# Patient Record
Sex: FEMALE | Race: WHITE | NOT HISPANIC OR LATINO | Employment: FULL TIME | URBAN - METROPOLITAN AREA
[De-identification: names, ages, dates, MRNs, and addresses within clinical notes are randomized per-mention and may not be internally consistent; named-entity substitution may affect disease eponyms.]

---

## 2023-03-24 LAB
CHLAMYDIA TRACH., AMPLIFIED: NEGATIVE
N. GONORRHEA, AMPLIFIED: NEGATIVE

## 2023-04-06 ENCOUNTER — OFFICE VISIT (OUTPATIENT)
Dept: PRIMARY CARE | Facility: CLINIC | Age: 33
End: 2023-04-06
Payer: COMMERCIAL

## 2023-04-06 VITALS
BODY MASS INDEX: 37.13 KG/M2 | SYSTOLIC BLOOD PRESSURE: 112 MMHG | DIASTOLIC BLOOD PRESSURE: 70 MMHG | HEART RATE: 84 BPM | HEIGHT: 62 IN | WEIGHT: 201.8 LBS

## 2023-04-06 DIAGNOSIS — D89.89 AUTOIMMUNE DISORDER (MULTI): ICD-10-CM

## 2023-04-06 DIAGNOSIS — F41.9 ANXIETY: Primary | ICD-10-CM

## 2023-04-06 PROBLEM — E78.5 DYSLIPIDEMIA: Status: ACTIVE | Noted: 2023-04-06

## 2023-04-06 PROBLEM — G47.00 INSOMNIA: Status: ACTIVE | Noted: 2023-04-06

## 2023-04-06 PROBLEM — R31.9 HEMATURIA: Status: ACTIVE | Noted: 2023-04-06

## 2023-04-06 PROBLEM — Z98.84 HISTORY OF BARIATRIC SURGERY: Status: ACTIVE | Noted: 2023-04-06

## 2023-04-06 PROBLEM — N94.6 DYSMENORRHEA: Status: ACTIVE | Noted: 2023-04-06

## 2023-04-06 PROBLEM — L70.9 ACNE: Status: ACTIVE | Noted: 2023-04-06

## 2023-04-06 PROBLEM — E61.1 IRON DEFICIENCY: Status: ACTIVE | Noted: 2023-04-06

## 2023-04-06 PROCEDURE — 99213 OFFICE O/P EST LOW 20 MIN: CPT | Performed by: STUDENT IN AN ORGANIZED HEALTH CARE EDUCATION/TRAINING PROGRAM

## 2023-04-06 PROCEDURE — 1036F TOBACCO NON-USER: CPT | Performed by: STUDENT IN AN ORGANIZED HEALTH CARE EDUCATION/TRAINING PROGRAM

## 2023-04-06 RX ORDER — SERTRALINE HYDROCHLORIDE 100 MG/1
100 TABLET, FILM COATED ORAL DAILY
COMMUNITY
Start: 2020-10-05 | End: 2023-10-20 | Stop reason: WASHOUT

## 2023-04-06 NOTE — PROGRESS NOTES
"Discuss appointment with Dr Torres, functional medicine in [place].    Subjective   Patient ID: Clover Chandler is a 32 y.o. female who presents for Follow-up.    HPI  Regarding functional medicine eval, recently established with Dr. Torres and was diagnosed with autoimmune brain disease per pt report. Was started on NeuroFlam supplement about 2-3wks ago and has noticed that she is starting to feel a little better. Has also cut out dairy, gluten, and processed sugar. Will be doing her own [place] this year. States that some mild epilepsy symptoms have surfaced due to resolution of inflammation in frontal lobe per her functional medicine doctor's report - having some zap/shock feelings and eye fluttering. Has a follow up appt scheduled in 1mo from now. Was told that a few of her autoimmune markers were consistent with autism. Also notes that her identical twin was recently diagnosed with autism.    Regarding anxiety, continues to do well on current dose of Zoloft. Struggling some with chronic fatigue which is attributed to above. Continues to follow with her usual therapist.    Regarding dysmenorrhea, scheduled for Liletta placement today.    Review of Systems   Constitutional:  Negative for chills and fever.   Respiratory:  Negative for cough and shortness of breath.    Cardiovascular:  Negative for chest pain and palpitations.   Skin:  Negative for rash.   Psychiatric/Behavioral:  Negative for dysphoric mood. The patient is not nervous/anxious.      Objective   /70   Pulse 84   Ht 1.575 m (5' 2\")   Wt 91.5 kg (201 lb 12.8 oz)   BMI 36.91 kg/m²     Physical Exam  Constitutional:       Appearance: Normal appearance.   HENT:      Head: Normocephalic.   Eyes:      General: No scleral icterus.     Conjunctiva/sclera: Conjunctivae normal.   Pulmonary:      Effort: Pulmonary effort is normal. No respiratory distress.   Skin:     Findings: No rash.   Neurological:      Mental Status: She is alert. "   Psychiatric:         Mood and Affect: Mood normal.         Behavior: Behavior normal.       Assessment/Plan   Problem List Items Addressed This Visit       Anxiety - Primary     Doing well with Zoloft. Will continue. Continue close follow up with therapist as previously scheduled.         Relevant Orders    Follow Up In Primary Care    Autoimmune disorder (CMS/HCC)     Diagnosed with autoimmune brain disease per pt report and starting to notice subjective improvement on NeuroFlam supplement. Plans to drop off records/labs for our review.        Follow up in 6mo for recheck, sooner if needed.

## 2023-04-07 PROBLEM — D89.89 AUTOIMMUNE DISORDER (MULTI): Status: ACTIVE | Noted: 2023-04-07

## 2023-04-07 ASSESSMENT — ENCOUNTER SYMPTOMS
DYSPHORIC MOOD: 0
SHORTNESS OF BREATH: 0
NERVOUS/ANXIOUS: 0
FEVER: 0
PALPITATIONS: 0
COUGH: 0
CHILLS: 0

## 2023-04-08 NOTE — ASSESSMENT & PLAN NOTE
Doing well with Zoloft. Will continue. Continue close follow up with therapist as previously scheduled.

## 2023-04-08 NOTE — ASSESSMENT & PLAN NOTE
Diagnosed with autoimmune brain disease per pt report and starting to notice subjective improvement on NeuroFlam supplement. Plans to drop off records/labs for our review.

## 2023-10-20 ENCOUNTER — TELEMEDICINE (OUTPATIENT)
Dept: PRIMARY CARE | Facility: CLINIC | Age: 33
End: 2023-10-20
Payer: COMMERCIAL

## 2023-10-20 DIAGNOSIS — J01.10 ACUTE NON-RECURRENT FRONTAL SINUSITIS: Primary | ICD-10-CM

## 2023-10-20 PROCEDURE — 99213 OFFICE O/P EST LOW 20 MIN: CPT | Performed by: NURSE PRACTITIONER

## 2023-10-20 RX ORDER — DOXYCYCLINE 100 MG/1
100 CAPSULE ORAL 2 TIMES DAILY
Qty: 14 CAPSULE | Refills: 0 | Status: SHIPPED | OUTPATIENT
Start: 2023-10-20 | End: 2023-10-27

## 2023-10-20 ASSESSMENT — ENCOUNTER SYMPTOMS
SORE THROAT: 1
TROUBLE SWALLOWING: 1
VOMITING: 0
MUSCULOSKELETAL NEGATIVE: 1
HOARSE VOICE: 1
FEVER: 0
DIARRHEA: 0
FATIGUE: 0
CARDIOVASCULAR NEGATIVE: 1
COUGH: 1
NEUROLOGICAL NEGATIVE: 1
ABDOMINAL PAIN: 0
SWOLLEN GLANDS: 1

## 2023-10-20 NOTE — PROGRESS NOTES
Subjective   Patient ID: Clover Chandler is a 33 y.o. female who presents for illness.    Sore Throat   This is a new problem. The current episode started yesterday. The problem has been gradually worsening. The pain is worse on the left side. There has been no fever. The pain is at a severity of 4/10. Associated symptoms include congestion, coughing, ear discharge, ear pain, a hoarse voice, a plugged ear sensation, swollen glands and trouble swallowing. Pertinent negatives include no abdominal pain, diarrhea, drooling or vomiting. She has had exposure to strep. She has had no exposure to mono. The treatment provided no relief.      Clover returns via virtual visit. ([facility] pt)    Symptoms started yesterday sore throat, raw feeling the day before. She has been doing cross fit, and has noticed that her chest has been feeling heavy when exercising yesterday. No fever, no shortness of breath. Cough non productive. Green nasal discharge. Has tried some OTC no relief of symptoms. Off work today.     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 Exposures.       Review of Systems   Constitutional:  Negative for fatigue and fever.   HENT:  Positive for congestion, ear discharge, ear pain, hoarse voice, sore throat and trouble swallowing. Negative for drooling.    Respiratory:  Positive for cough.    Cardiovascular: Negative.    Gastrointestinal:  Negative for abdominal pain, diarrhea and vomiting.   Genitourinary: Negative.    Musculoskeletal: Negative.    Neurological: Negative.        Objective   There were no vitals taken for this visit.    Physical Exam  Constitutional:       General: She is not in acute distress.     Appearance: Normal appearance. She is ill-appearing. She is not toxic-appearing or diaphoretic.   HENT:      Nose: Congestion present.   Pulmonary:      Effort: Pulmonary effort is normal.   Neurological:      General: No focal deficit present.      Mental Status: She is alert and oriented to  person, place, and time.   Psychiatric:         Mood and Affect: Mood normal.         Behavior: Behavior normal.         Thought Content: Thought content normal.         Judgment: Judgment normal.         Assessment/Plan   Problem List Items Addressed This Visit    None  Visit Diagnoses         Codes    Acute non-recurrent frontal sinusitis    -  Primary J01.10    Relevant Medications    doxycycline (Vibramycin) 100 mg capsule              Follow up as scheduled.

## 2023-10-20 NOTE — LETTER
Clover Chandler was seen on 10/20/2023.  She may return to work on 10/21/2023.       If you have any questions or concerns, please don't hesitate to call.      Danielle Thapa, CNP

## 2023-10-23 ENCOUNTER — OFFICE VISIT (OUTPATIENT)
Dept: PRIMARY CARE | Facility: CLINIC | Age: 33
End: 2023-10-23
Payer: COMMERCIAL

## 2023-10-23 VITALS
HEART RATE: 76 BPM | HEIGHT: 62 IN | BODY MASS INDEX: 38.68 KG/M2 | DIASTOLIC BLOOD PRESSURE: 74 MMHG | SYSTOLIC BLOOD PRESSURE: 122 MMHG | WEIGHT: 210.2 LBS

## 2023-10-23 DIAGNOSIS — E66.01 CLASS 2 SEVERE OBESITY DUE TO EXCESS CALORIES WITH SERIOUS COMORBIDITY AND BODY MASS INDEX (BMI) OF 38.0 TO 38.9 IN ADULT (MULTI): Primary | ICD-10-CM

## 2023-10-23 DIAGNOSIS — D89.89 AUTOIMMUNE DISORDER (MULTI): ICD-10-CM

## 2023-10-23 DIAGNOSIS — E88.819 INSULIN RESISTANCE: ICD-10-CM

## 2023-10-23 DIAGNOSIS — F41.9 ANXIETY: ICD-10-CM

## 2023-10-23 DIAGNOSIS — R73.01 ELEVATED FASTING GLUCOSE: ICD-10-CM

## 2023-10-23 DIAGNOSIS — F32.0 CURRENT MILD EPISODE OF MAJOR DEPRESSIVE DISORDER WITHOUT PRIOR EPISODE (CMS-HCC): ICD-10-CM

## 2023-10-23 PROBLEM — E66.812 CLASS 2 SEVERE OBESITY DUE TO EXCESS CALORIES WITH SERIOUS COMORBIDITY AND BODY MASS INDEX (BMI) OF 38.0 TO 38.9 IN ADULT: Status: ACTIVE | Noted: 2023-10-23

## 2023-10-23 PROCEDURE — 1036F TOBACCO NON-USER: CPT | Performed by: STUDENT IN AN ORGANIZED HEALTH CARE EDUCATION/TRAINING PROGRAM

## 2023-10-23 PROCEDURE — 99213 OFFICE O/P EST LOW 20 MIN: CPT | Performed by: STUDENT IN AN ORGANIZED HEALTH CARE EDUCATION/TRAINING PROGRAM

## 2023-10-23 PROCEDURE — 3008F BODY MASS INDEX DOCD: CPT | Performed by: STUDENT IN AN ORGANIZED HEALTH CARE EDUCATION/TRAINING PROGRAM

## 2023-10-23 ASSESSMENT — ENCOUNTER SYMPTOMS
SHORTNESS OF BREATH: 0
FEVER: 0
DYSPHORIC MOOD: 0
NERVOUS/ANXIOUS: 0
COUGH: 0
PALPITATIONS: 0
CHILLS: 0

## 2023-10-23 NOTE — ASSESSMENT & PLAN NOTE
Dietary modifications and recommendation for 150 minutes of moderate-intensity exercise per week reviewed. Pt interested in pharmacologic assistance. Discussed options and decided to trial GLP1. Medication dosing and side effects reviewed. Follow up in 6wks for recheck, sooner if needed.

## 2023-10-23 NOTE — PROGRESS NOTES
"Subjective   Patient ID: Clover Chandler is a 33 y.o. female who presents for discuss weight loss.     HPI  BMI - doing crossfit 3x/wk 60min and walking 2x/wk 3-4mi (~1hr), not calculating macros due to hx disordered eating but really watching her diet, usually grazes small frequent meals due to hx gastric sleeve, cut out almost all grains due to autoimmune condition, no personal hx pancreatitis or fhx thyroid ca, diagnosed with insulin resistance by functional medicine provider, she has personal hx elevated BG and has strong fhx DM2    Anx/dep - stopped Zoloft when she started taking neuro inflammatory, depression flaring some but manageable, anxiety doing well, crossfit helping    Review of Systems   Constitutional:  Negative for chills and fever.   Respiratory:  Negative for cough and shortness of breath.    Cardiovascular:  Negative for chest pain and palpitations.   Skin:  Negative for rash.   Psychiatric/Behavioral:  Negative for dysphoric mood. The patient is not nervous/anxious.      Objective   /74   Pulse 76   Ht 1.575 m (5' 2\")   Wt 95.3 kg (210 lb 3.2 oz)   BMI 38.45 kg/m²     Physical Exam  Constitutional:       Appearance: Normal appearance.   HENT:      Head: Normocephalic.   Eyes:      General: No scleral icterus.     Conjunctiva/sclera: Conjunctivae normal.   Pulmonary:      Effort: Pulmonary effort is normal. No respiratory distress.   Musculoskeletal:         General: Normal range of motion.   Skin:     Findings: No rash.   Neurological:      Mental Status: She is alert.   Psychiatric:         Mood and Affect: Mood normal.         Behavior: Behavior normal.       Assessment/Plan   Problem List Items Addressed This Visit             ICD-10-CM    Insulin resistance E88.819     GLP1 trial.         Relevant Medications    semaglutide 0.25 mg or 0.5 mg (2 mg/3 mL) pen injector    Other Relevant Orders    Follow Up In Primary Care - Established    Elevated fasting glucose R73.01     GLP1 " trial.         Relevant Medications    semaglutide 0.25 mg or 0.5 mg (2 mg/3 mL) pen injector    Other Relevant Orders    Follow Up In Primary Care - Established    Current mild episode of major depressive disorder without prior episode (CMS/[facility]) F32.0     Overall managing well off the SSRI. Will continue to monitor.         Class 2 severe obesity due to excess calories with serious comorbidity and body mass index (BMI) of 38.0 to 38.9 in adult (CMS/[facility]) - Primary E66.01, Z68.38     Dietary modifications and recommendation for 150 minutes of moderate-intensity exercise per week reviewed. Pt interested in pharmacologic assistance. Discussed options and decided to trial GLP1. Medication dosing and side effects reviewed. Follow up in 6wks for recheck, sooner if needed.         Relevant Medications    semaglutide 0.25 mg or 0.5 mg (2 mg/3 mL) pen injector    Other Relevant Orders    Follow Up In Primary Care - Established    Autoimmune disorder (CMS/[facility]) D89.89     Pt will try to get us copy of workup done by functional medicine provider.         Anxiety F41.9     Managing well off the SSRI.        Follow up in 6wk for recheck, sooner if needed.

## 2023-10-30 DIAGNOSIS — F32.0 CURRENT MILD EPISODE OF MAJOR DEPRESSIVE DISORDER WITHOUT PRIOR EPISODE (CMS-HCC): Primary | ICD-10-CM

## 2023-11-02 RX ORDER — BUPROPION HYDROCHLORIDE 150 MG/1
150 TABLET ORAL EVERY MORNING
Qty: 30 TABLET | Refills: 1 | Status: SHIPPED | OUTPATIENT
Start: 2023-11-02 | End: 2024-01-24 | Stop reason: SDUPTHER

## 2023-12-11 ENCOUNTER — APPOINTMENT (OUTPATIENT)
Dept: PRIMARY CARE | Facility: CLINIC | Age: 33
End: 2023-12-11
Payer: COMMERCIAL

## 2023-12-27 DIAGNOSIS — F32.0 CURRENT MILD EPISODE OF MAJOR DEPRESSIVE DISORDER WITHOUT PRIOR EPISODE (CMS-HCC): ICD-10-CM

## 2024-01-24 ENCOUNTER — OFFICE VISIT (OUTPATIENT)
Dept: PRIMARY CARE | Facility: CLINIC | Age: 34
End: 2024-01-24
Payer: COMMERCIAL

## 2024-01-24 VITALS
DIASTOLIC BLOOD PRESSURE: 64 MMHG | HEIGHT: 62 IN | BODY MASS INDEX: 39.71 KG/M2 | SYSTOLIC BLOOD PRESSURE: 106 MMHG | WEIGHT: 215.8 LBS | HEART RATE: 80 BPM

## 2024-01-24 DIAGNOSIS — E66.01 CLASS 2 SEVERE OBESITY DUE TO EXCESS CALORIES WITH SERIOUS COMORBIDITY AND BODY MASS INDEX (BMI) OF 39.0 TO 39.9 IN ADULT (MULTI): Primary | ICD-10-CM

## 2024-01-24 DIAGNOSIS — D89.89 AUTOIMMUNE DISORDER (MULTI): ICD-10-CM

## 2024-01-24 DIAGNOSIS — F32.0 CURRENT MILD EPISODE OF MAJOR DEPRESSIVE DISORDER WITHOUT PRIOR EPISODE (CMS-HCC): ICD-10-CM

## 2024-01-24 DIAGNOSIS — R00.2 PALPITATIONS: ICD-10-CM

## 2024-01-24 PROCEDURE — 99214 OFFICE O/P EST MOD 30 MIN: CPT | Performed by: STUDENT IN AN ORGANIZED HEALTH CARE EDUCATION/TRAINING PROGRAM

## 2024-01-24 PROCEDURE — 1036F TOBACCO NON-USER: CPT | Performed by: STUDENT IN AN ORGANIZED HEALTH CARE EDUCATION/TRAINING PROGRAM

## 2024-01-24 PROCEDURE — 3008F BODY MASS INDEX DOCD: CPT | Performed by: STUDENT IN AN ORGANIZED HEALTH CARE EDUCATION/TRAINING PROGRAM

## 2024-01-24 RX ORDER — BUPROPION HYDROCHLORIDE 150 MG/1
TABLET ORAL
Qty: 30 TABLET | Refills: 0 | OUTPATIENT
Start: 2024-01-24

## 2024-01-24 RX ORDER — BUPROPION HYDROCHLORIDE 150 MG/1
150 TABLET ORAL EVERY MORNING
Qty: 90 TABLET | Refills: 1 | Status: SHIPPED | OUTPATIENT
Start: 2024-01-24 | End: 2025-01-23

## 2024-01-24 ASSESSMENT — ENCOUNTER SYMPTOMS
CHILLS: 0
NERVOUS/ANXIOUS: 0
FEVER: 0
PALPITATIONS: 1
SHORTNESS OF BREATH: 0
DYSPHORIC MOOD: 0
COUGH: 0

## 2024-01-24 NOTE — PROGRESS NOTES
"Subjective   Patient ID: Clover Chandler is a 33 y.o. female who presents for follow up on medication    HPI   BMI - ran out of Wellbutrin a couple weeks ago, does think it helped some with motivation and is interested in restarting, she has consistently been doing Crossfit and following healthy diet since 10/2023, choosing antiinflammatory meals through meal delivery service    Autoimmune disorder - considering establishing with new specialist, considering neurofeedback in [place]    Palpitations - has noticed occasional episodes at random, HR increases from 60 to 150 at rest with associated palpitations that somewhat take her breath away, feels like she is on verge of a panic attack, otherwise denies CP, HA, LH, dizziness, episodes last on average 10-15min, occur on average 3x/month, has fhx a fib, symptoms seem to resolve with valsalva    Review of Systems   Constitutional:  Negative for chills and fever.   Respiratory:  Negative for cough and shortness of breath.    Cardiovascular:  Positive for palpitations. Negative for chest pain.   Skin:  Negative for rash.   Psychiatric/Behavioral:  Negative for dysphoric mood. The patient is not nervous/anxious.      Objective   /64   Pulse 80   Ht 1.575 m (5' 2\")   Wt 97.9 kg (215 lb 12.8 oz)   BMI 39.47 kg/m²     Physical Exam  Constitutional:       Appearance: Normal appearance.   HENT:      Head: Normocephalic and atraumatic.   Eyes:      General: No scleral icterus.     Conjunctiva/sclera: Conjunctivae normal.   Cardiovascular:      Rate and Rhythm: Normal rate and regular rhythm.      Heart sounds: No murmur heard.  Pulmonary:      Effort: Pulmonary effort is normal. No respiratory distress.      Breath sounds: Normal breath sounds.   Musculoskeletal:         General: Normal range of motion.      Cervical back: Normal range of motion and neck supple.   Skin:     General: Skin is warm and dry.      Findings: No rash.   Neurological:      General: No focal " deficit present.      Mental Status: She is alert.   Psychiatric:         Mood and Affect: Mood normal.         Behavior: Behavior normal.       Assessment/Plan   Problem List Items Addressed This Visit             ICD-10-CM    Palpitations R00.2     Occasional with associated tachycardia, SOB with fhx a fib. Reviewed possible etiologies and usual workup. Using shared decision making and given infrequent, we decided to continue to monitor for now. If more frequent or increasing in severity (or if persistent at next eval), will plan for further workup with likely echo/Holter. Return precautions reviewed.          Current mild episode of major depressive disorder without prior episode (CMS/[facility]) F32.0     Will restart Wellbutrin.         Relevant Medications    buPROPion XL (Wellbutrin XL) 150 mg 24 hr tablet    Other Relevant Orders    Follow Up In Primary Care - Established    Class 2 severe obesity due to excess calories with serious comorbidity and body mass index (BMI) of 39.0 to 39.9 in adult (CMS/[facility]) - Primary E66.01, Z68.39     Continues to work hard on lifestyle modification. Using shared decision making, we decided to restart Wellbutrin. Medication dosing and side effects reviewed. She will let us know if she is interested in increasing to 300mg every day prior to next appt. Return precautions reviewed.          Autoimmune disorder (CMS/[facility]) D89.89     Stable.        Follow up in 6mo for recheck, sooner if needed.   Time Spent  Prep time on day of patient encounter: 1 minutes  Time spent directly with patient, family or caregiver: 28 minutes  Additional Time Spent on Patient Care Activities: 0 minutes  Documentation Time: 6 minutes  Other Time Spent: 0 minutes  Total: 35 minutes

## 2024-01-25 NOTE — ASSESSMENT & PLAN NOTE
Continues to work hard on lifestyle modification. Using shared decision making, we decided to restart Wellbutrin. Medication dosing and side effects reviewed. She will let us know if she is interested in increasing to 300mg every day prior to next appt. Return precautions reviewed.

## 2024-01-25 NOTE — ASSESSMENT & PLAN NOTE
Occasional with associated tachycardia, SOB with fhx a fib. Reviewed possible etiologies and usual workup. Using shared decision making and given infrequent, we decided to continue to monitor for now. If more frequent or increasing in severity (or if persistent at next eval), will plan for further workup with likely echo/Holter. Return precautions reviewed.

## 2024-06-12 ENCOUNTER — OFFICE VISIT (OUTPATIENT)
Dept: OBSTETRICS AND GYNECOLOGY | Facility: CLINIC | Age: 34
End: 2024-06-12
Payer: COMMERCIAL

## 2024-06-12 ENCOUNTER — LAB (OUTPATIENT)
Dept: LAB | Facility: LAB | Age: 34
End: 2024-06-12
Payer: COMMERCIAL

## 2024-06-12 VITALS — BODY MASS INDEX: 39.64 KG/M2 | WEIGHT: 215.4 LBS | HEIGHT: 62 IN

## 2024-06-12 DIAGNOSIS — Z11.3 SCREENING EXAMINATION FOR STD (SEXUALLY TRANSMITTED DISEASE): ICD-10-CM

## 2024-06-12 DIAGNOSIS — B37.31 YEAST VAGINITIS: ICD-10-CM

## 2024-06-12 DIAGNOSIS — Z11.3 SCREENING EXAMINATION FOR STD (SEXUALLY TRANSMITTED DISEASE): Primary | ICD-10-CM

## 2024-06-12 LAB
POC CLUE CELL WET PREP: ABNORMAL
POC TRICHOMONAS WET PREP: ABNORMAL
POC WBC WET PREP: ABNORMAL
POC YEAST CELLS WET PREP: PRESENT

## 2024-06-12 PROCEDURE — 36415 COLL VENOUS BLD VENIPUNCTURE: CPT

## 2024-06-12 PROCEDURE — 87491 CHLMYD TRACH DNA AMP PROBE: CPT

## 2024-06-12 PROCEDURE — 87210 SMEAR WET MOUNT SALINE/INK: CPT | Performed by: OBSTETRICS & GYNECOLOGY

## 2024-06-12 PROCEDURE — 3008F BODY MASS INDEX DOCD: CPT | Performed by: OBSTETRICS & GYNECOLOGY

## 2024-06-12 PROCEDURE — 86780 TREPONEMA PALLIDUM: CPT

## 2024-06-12 PROCEDURE — 87389 HIV-1 AG W/HIV-1&-2 AB AG IA: CPT

## 2024-06-12 PROCEDURE — 99213 OFFICE O/P EST LOW 20 MIN: CPT | Performed by: OBSTETRICS & GYNECOLOGY

## 2024-06-12 PROCEDURE — 1036F TOBACCO NON-USER: CPT | Performed by: OBSTETRICS & GYNECOLOGY

## 2024-06-12 PROCEDURE — 87591 N.GONORRHOEAE DNA AMP PROB: CPT

## 2024-06-12 RX ORDER — FLUCONAZOLE 150 MG/1
150 TABLET ORAL ONCE
Qty: 1 TABLET | Refills: 0 | Status: SHIPPED | OUTPATIENT
Start: 2024-06-12 | End: 2024-06-12

## 2024-06-12 NOTE — PROGRESS NOTES
Clover Chandler is a 33 y.o. year old female patient.  PCP = Dee Anderson DO    Chief Complaint   Patient presents with    Vaginitis/Bacterial Vaginosis     Patient here for poss yeast infection. Patient has had itching and irritation for the past 3 days. Denies any abnormal discharge or burning. Patient would also like a full STD testing panel done. LMP: IUD        HPI   Presents being that she has noticed some vaginal itching with slight discharge over the last 3 days.  She is concerned for possible yeast infection.  She also wishes to be screened for sexually transmitted diseases as she had a new partner recently.  She currently has IUD in place.  Denies any pelvic pain.    OB History          0    Para   0    Term   0       0    AB   0    Living   0         SAB   0    IAB   0    Ectopic   0    Multiple   0    Live Births   0                 Past Medical History:   Diagnosis Date    Acne     Acute insomnia     Anxiety        Past Surgical History:   Procedure Laterality Date    BARIATRIC SURGERY  2021    Gastric Sleeve; [facility]    COLONOSCOPY  2013    EYE SURGERY  1993    18 Months old       Review of Systems:   Constitutional: No fever or chills  Respiratory: No shortness of breath, or cough  Cardiovascular: No chest pain or syncope  Breasts: No breast pain, no masses, no nipple discharge  Gastrointestinal: No nausea, vomiting, or diarrhea, no abdominal pain  Genitourinary: No dysuria or frequency  Gynecology: Negative except as noted in history of present illness  All other: All other systems reviewed and negative for complaint    Medication Documentation Review Audit       Reviewed by Xavi Chadwick MD (Physician) on 24 at 1442      Medication Order Taking? Sig Documenting Provider Last Dose Status   buPROPion XL (Wellbutrin XL) 150 mg 24 hr tablet [id number]  Take 1 tablet (150 mg) by mouth once daily in the morning. Do not crush, chew, or split. Dee Anderson DO  Active       "               Ht 1.575 m (5' 2\")   Wt 97.7 kg (215 lb 6.4 oz)   BMI 39.40 kg/m²     PHYSICAL EXAMINATION:  Well-developed, well nourished, in no acute distress, alert and oriented x three, is pleasant and cooperative.  HEENT: Clear. Pupils equal, round and reactive to light and accommodation. Extraocular muscles are intact. Oral mucosa pink without exudate.   NECK: No lymphadenopathy, no thyromegaly.  LUNGS: Clear bilaterally.  HEART: Regular rate and rhythm without murmurs.  ABDOMEN: Normoactive bowel sounds, soft and nontender, no guarding or rebound tenderness, no CVA tenderness.  EXTREMITIES: No clubbing, cyanosis or edema.  NEUROLOGIC:  Cranial nerves II-XII grossly intact.  :  Normal external female genitalia, normal vulva, normal vagina. Normal urethral meatus, urethra and bladder. Normal appearing cervix.  IUD string seen at the cervix.  Normal-sized uterus, no adnexal masses or tenderness.  Wet prep is positive for yeast, negative for trichomonas or bacterial vaginosis.        Problem List Items Addressed This Visit    None  Visit Diagnoses       Screening examination for STD (sexually transmitted disease)    -  Primary    Relevant Orders    C. Trachomatis / N. Gonorrhoeae, Amplified Detection    HIV 1/2 Antigen/Antibody Screen with Reflex to Confirmation    Syphilis Screen with Reflex    Yeast vaginitis        Relevant Medications    fluconazole (Diflucan) 150 mg tablet    Other Relevant Orders    POCT Wet Mount manually resulted (Completed)             Provider Impression:  1.  Yeast vaginitis  2.  Screen for STD  We will obtain GC, chlamydia, HIV and syphilis testing.  Patient will be notified of the results.  Prescription for Diflucan for the yeast infection.  Patient to return in 2 months for annual exam.    "

## 2024-06-13 LAB
C TRACH RRNA SPEC QL NAA+PROBE: NEGATIVE
HIV 1+2 AB+HIV1 P24 AG SERPL QL IA: NONREACTIVE
N GONORRHOEA DNA SPEC QL PROBE+SIG AMP: NEGATIVE
TREPONEMA PALLIDUM IGG+IGM AB [PRESENCE] IN SERUM OR PLASMA BY IMMUNOASSAY: NONREACTIVE

## 2024-06-17 ENCOUNTER — TELEPHONE (OUTPATIENT)
Dept: OBSTETRICS AND GYNECOLOGY | Facility: CLINIC | Age: 34
End: 2024-06-17

## 2024-06-17 NOTE — TELEPHONE ENCOUNTER
----- Message from Xavi Chadwick MD sent at 6/14/2024  9:37 AM EDT -----  Please inform patient that the GC and Chlamydia cultures are negative.

## 2024-06-18 ENCOUNTER — APPOINTMENT (OUTPATIENT)
Dept: PRIMARY CARE | Facility: CLINIC | Age: 34
End: 2024-06-18
Payer: COMMERCIAL

## 2024-07-24 ENCOUNTER — APPOINTMENT (OUTPATIENT)
Dept: PRIMARY CARE | Facility: CLINIC | Age: 34
End: 2024-07-24
Payer: COMMERCIAL

## 2024-08-13 ENCOUNTER — APPOINTMENT (OUTPATIENT)
Dept: OBSTETRICS AND GYNECOLOGY | Facility: CLINIC | Age: 34
End: 2024-08-13
Payer: COMMERCIAL

## 2024-09-17 ENCOUNTER — OFFICE VISIT (OUTPATIENT)
Dept: PRIMARY CARE | Facility: CLINIC | Age: 34
End: 2024-09-17
Payer: COMMERCIAL

## 2024-09-17 VITALS
HEIGHT: 62 IN | WEIGHT: 221.6 LBS | BODY MASS INDEX: 40.78 KG/M2 | DIASTOLIC BLOOD PRESSURE: 74 MMHG | HEART RATE: 56 BPM | SYSTOLIC BLOOD PRESSURE: 108 MMHG

## 2024-09-17 DIAGNOSIS — M25.50 MULTIPLE JOINT PAIN: Primary | ICD-10-CM

## 2024-09-17 PROCEDURE — 1036F TOBACCO NON-USER: CPT | Performed by: STUDENT IN AN ORGANIZED HEALTH CARE EDUCATION/TRAINING PROGRAM

## 2024-09-17 PROCEDURE — 3008F BODY MASS INDEX DOCD: CPT | Performed by: STUDENT IN AN ORGANIZED HEALTH CARE EDUCATION/TRAINING PROGRAM

## 2024-09-17 PROCEDURE — 99212 OFFICE O/P EST SF 10 MIN: CPT | Performed by: STUDENT IN AN ORGANIZED HEALTH CARE EDUCATION/TRAINING PROGRAM

## 2024-09-17 RX ORDER — PREDNISONE 20 MG/1
40 TABLET ORAL DAILY
Qty: 10 TABLET | Refills: 0 | Status: SHIPPED | OUTPATIENT
Start: 2024-09-17

## 2024-09-17 NOTE — PROGRESS NOTES
"Subjective   Patient ID: Clover Chandler is a 34 y.o. female who presents for muscle stiffness, joint stiffness, anxiety. 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    HPI  Presenting today with overall flare of constellation of symptoms. Symptoms have been intermittent since spring of this year. Symptoms seem to flare every 3 weeks. Struggling with intermittent diffuse joint pain/stiffness, enlarged lymph nodes, gum pain. Went to dentist due to the gum pain and was told that she has TMJ and pinched nerve in her face. Was referred to chiropractor, which helped temporarily. Costly so not able to go regularly. States that she is having to manually drain her lymph system for the past 2wks. Seems to be helping. States that her neck and back are swollen every morning. Takes 2hr to get moving. States that she has been dealing with significant life stressors over the past few months. Continues to follow regularly with her therapist. Has been journaling. Nervous system feel overloaded. Of note, her twin sister has similar constellation of symptoms.    Review of Systems   Constitutional:  Negative for chills and fever.   HENT:          Gum pain   Respiratory:  Negative for cough and shortness of breath.    Cardiovascular:  Negative for chest pain.   Musculoskeletal:  Positive for arthralgias, back pain and myalgias.   Psychiatric/Behavioral:  Negative for dysphoric mood. The patient is nervous/anxious.      Objective   /74   Pulse 56   Ht 1.575 m (5' 2\")   Wt 101 kg (221 lb 9.6 oz)   BMI 40.53 kg/m²     Physical Exam  Vitals reviewed.   Constitutional:       General: She is not in acute distress.     Appearance: Normal appearance.   HENT:      Head: Normocephalic and atraumatic.   Eyes:      General: No scleral icterus.     Conjunctiva/sclera: Conjunctivae normal.   Cardiovascular:      Rate and Rhythm: Normal rate and regular rhythm.   Pulmonary:      Effort: Pulmonary effort is normal. No respiratory distress.     "  Breath sounds: Normal breath sounds.   Abdominal:      General: Bowel sounds are normal.      Palpations: Abdomen is soft.      Tenderness: There is abdominal tenderness (mild generalized). There is no guarding.   Musculoskeletal:         General: Tenderness (diffuse ttp paraspinal musculature cervical/thoracic/lumbar spine) present.      Cervical back: Normal range of motion and neck supple.   Skin:     General: Skin is warm.      Findings: No rash.   Neurological:      Mental Status: She is alert. Mental status is at baseline.   Psychiatric:         Mood and Affect: Mood normal.         Behavior: Behavior normal.       Assessment/Plan   Problem List Items Addressed This Visit             ICD-10-CM    Multiple joint pain - Primary M25.50     Presenting today with flare of her underlying autoimmune condition for which she previously followed with functional medicine. I suspect psychosomatic component due to recent life stressors and exacerbation of anxiety. We will trial short oral steroid taper due to significant joint pain/stiffness. Medication dosing and side effects reviewed. Return precautions reviewed.          Relevant Medications    predniSONE (Deltasone) 20 mg tablet

## 2024-09-29 PROBLEM — M25.50 MULTIPLE JOINT PAIN: Status: ACTIVE | Noted: 2024-09-29

## 2024-09-29 ASSESSMENT — ENCOUNTER SYMPTOMS
CHILLS: 0
ARTHRALGIAS: 1
DYSPHORIC MOOD: 0
COUGH: 0
NERVOUS/ANXIOUS: 1
MYALGIAS: 1
BACK PAIN: 1
FEVER: 0
SHORTNESS OF BREATH: 0

## 2024-09-29 NOTE — ASSESSMENT & PLAN NOTE
Presenting today with flare of her underlying autoimmune condition for which she previously followed with functional medicine. I suspect psychosomatic component due to recent life stressors and exacerbation of anxiety. We will trial short oral steroid taper due to significant joint pain/stiffness. Medication dosing and side effects reviewed. Return precautions reviewed.

## 2024-11-27 ENCOUNTER — TELEPHONE (OUTPATIENT)
Dept: FAMILY MEDICINE CLINIC | Age: 34
End: 2024-11-27

## 2024-11-27 ENCOUNTER — OFFICE VISIT (OUTPATIENT)
Dept: FAMILY MEDICINE CLINIC | Age: 34
End: 2024-11-27
Payer: COMMERCIAL

## 2024-11-27 ENCOUNTER — PATIENT MESSAGE (OUTPATIENT)
Dept: FAMILY MEDICINE CLINIC | Age: 34
End: 2024-11-27

## 2024-11-27 VITALS
HEART RATE: 76 BPM | DIASTOLIC BLOOD PRESSURE: 78 MMHG | WEIGHT: 209 LBS | OXYGEN SATURATION: 99 % | SYSTOLIC BLOOD PRESSURE: 122 MMHG | TEMPERATURE: 97.6 F | BODY MASS INDEX: 38.46 KG/M2 | HEIGHT: 62 IN

## 2024-11-27 DIAGNOSIS — E60 ZINC DEFICIENCY: ICD-10-CM

## 2024-11-27 DIAGNOSIS — B94.8 PANDAS (PEDIATRIC AUTOIMMUNE NEUROPSYCHIATRIC DISEASE ASSOCIATED WITH STREPTOCOCCAL INFECTION) (HCC): Primary | ICD-10-CM

## 2024-11-27 DIAGNOSIS — E61.0 COPPER DEFICIENCY: ICD-10-CM

## 2024-11-27 DIAGNOSIS — R25.2 MUSCLE CRAMPING: ICD-10-CM

## 2024-11-27 DIAGNOSIS — U09.9 COVID-19 LONG HAULER: ICD-10-CM

## 2024-11-27 DIAGNOSIS — B27.90 EPSTEIN BARR INFECTION: ICD-10-CM

## 2024-11-27 DIAGNOSIS — F51.04 CHRONIC INSOMNIA: ICD-10-CM

## 2024-11-27 DIAGNOSIS — E61.1 IRON DEFICIENCY: ICD-10-CM

## 2024-11-27 DIAGNOSIS — E03.8 OTHER SPECIFIED HYPOTHYROIDISM: ICD-10-CM

## 2024-11-27 DIAGNOSIS — R60.9 WATER RETENTION: ICD-10-CM

## 2024-11-27 DIAGNOSIS — D89.89 PANDAS (PEDIATRIC AUTOIMMUNE NEUROPSYCHIATRIC DISEASE ASSOCIATED WITH STREPTOCOCCAL INFECTION) (HCC): Primary | ICD-10-CM

## 2024-11-27 PROCEDURE — 99204 OFFICE O/P NEW MOD 45 MIN: CPT | Performed by: FAMILY MEDICINE

## 2024-11-27 RX ORDER — FUROSEMIDE 20 MG/1
20 TABLET ORAL DAILY
Qty: 10 TABLET | Refills: 5 | Status: SHIPPED | OUTPATIENT
Start: 2024-11-27

## 2024-11-27 RX ORDER — LEVONORGESTREL 52 MG/1
1 INTRAUTERINE DEVICE INTRAUTERINE ONCE
COMMUNITY

## 2024-11-27 ASSESSMENT — PATIENT HEALTH QUESTIONNAIRE - PHQ9
SUM OF ALL RESPONSES TO PHQ9 QUESTIONS 1 & 2: 0
SUM OF ALL RESPONSES TO PHQ QUESTIONS 1-9: 0
SUM OF ALL RESPONSES TO PHQ QUESTIONS 1-9: 0
1. LITTLE INTEREST OR PLEASURE IN DOING THINGS: NOT AT ALL
2. FEELING DOWN, DEPRESSED OR HOPELESS: NOT AT ALL
SUM OF ALL RESPONSES TO PHQ QUESTIONS 1-9: 0
SUM OF ALL RESPONSES TO PHQ QUESTIONS 1-9: 0

## 2024-11-27 NOTE — TELEPHONE ENCOUNTER
Per Dr. Grove, he would like her to have an additional lab drawn when she goes in to have her labs drawn ([facility]). This has been ordered, but will need faxed to wherever she is planning to have her labs drawn.     Tried calling patient x2. No answer and unable to leave message as voicemail is not set up.
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
## 2024-12-03 ENCOUNTER — LAB (OUTPATIENT)
Dept: LAB | Facility: LAB | Age: 34
End: 2024-12-03
Payer: COMMERCIAL

## 2024-12-03 DIAGNOSIS — U09.9 POST COVID-19 CONDITION, UNSPECIFIED: ICD-10-CM

## 2024-12-03 DIAGNOSIS — E61.1 IRON DEFICIENCY: ICD-10-CM

## 2024-12-03 DIAGNOSIS — B94.8 SEQUELAE OF OTHER SPECIFIED INFECTIOUS AND PARASITIC DISEASES: ICD-10-CM

## 2024-12-03 DIAGNOSIS — D89.89 OTHER SPECIFIED DISORDERS INVOLVING THE IMMUNE MECHANISM, NOT ELSEWHERE CLASSIFIED: ICD-10-CM

## 2024-12-03 DIAGNOSIS — B27.90 INFECTIOUS MONONUCLEOSIS, UNSPECIFIED WITHOUT COMPLICATION: Primary | ICD-10-CM

## 2024-12-03 DIAGNOSIS — E61.0 COPPER DEFICIENCY: ICD-10-CM

## 2024-12-03 DIAGNOSIS — E60 DIETARY ZINC DEFICIENCY: ICD-10-CM

## 2024-12-03 PROCEDURE — 82525 ASSAY OF COPPER: CPT

## 2024-12-03 PROCEDURE — 86665 EPSTEIN-BARR CAPSID VCA: CPT

## 2024-12-03 PROCEDURE — 84630 ASSAY OF ZINC: CPT

## 2024-12-03 PROCEDURE — 86800 THYROGLOBULIN ANTIBODY: CPT

## 2024-12-03 PROCEDURE — 86663 EPSTEIN-BARR ANTIBODY: CPT

## 2024-12-03 PROCEDURE — 36415 COLL VENOUS BLD VENIPUNCTURE: CPT

## 2024-12-03 PROCEDURE — 86664 EPSTEIN-BARR NUCLEAR ANTIGEN: CPT

## 2024-12-03 PROCEDURE — 86628 CANDIDA ANTIBODY: CPT

## 2024-12-03 PROCEDURE — 86769 SARS-COV-2 COVID-19 ANTIBODY: CPT

## 2024-12-03 PROCEDURE — 86376 MICROSOMAL ANTIBODY EACH: CPT

## 2024-12-03 PROCEDURE — 84590 ASSAY OF VITAMIN A: CPT

## 2024-12-03 PROCEDURE — 86618 LYME DISEASE ANTIBODY: CPT

## 2024-12-03 PROCEDURE — 82390 ASSAY OF CERULOPLASMIN: CPT

## 2024-12-04 LAB
CERULOPLASMIN SERPL-MCNC: 27.8 MG/DL (ref 20–60)
EBV EA IGG SER QL: POSITIVE
EBV NA AB SER QL: POSITIVE
EBV VCA IGG SER IA-ACNC: POSITIVE
EBV VCA IGM SER IA-ACNC: NEGATIVE
THYROPEROXIDASE AB SERPL-ACNC: 39 IU/ML

## 2024-12-05 LAB
B BURGDOR.VLSE1+PEPC10 AB SER IA-ACNC: 0.68 IV
COPPER SERPL-MCNC: 104 UG/DL (ref 80–155)
THYROGLOB AB SERPL-ACNC: <0.9 IU/ML (ref 0–4)
ZINC SERPL-MCNC: 74.4 UG/DL (ref 60–120)

## 2024-12-06 LAB
ANNOTATION COMMENT IMP: NORMAL
RETINYL PALMITATE SERPL-MCNC: <0.02 MG/L (ref 0–0.1)
VIT A SERPL-MCNC: 0.71 MG/L (ref 0.3–1.2)

## 2024-12-11 LAB
C ALBICANS AB SER QL ID: NEGATIVE
SARS-COV-2 AB SERPL-IMP: POSITIVE
SARS-COV-2 IGG SERPL IA-ACNC: >800 AU/ML

## 2024-12-12 ENCOUNTER — TELEPHONE (OUTPATIENT)
Dept: FAMILY MEDICINE CLINIC | Age: 34
End: 2024-12-12

## 2024-12-18 ENCOUNTER — TELEMEDICINE (OUTPATIENT)
Age: 34
End: 2024-12-18
Payer: COMMERCIAL

## 2024-12-18 DIAGNOSIS — D89.89 PANDAS (PEDIATRIC AUTOIMMUNE NEUROPSYCHIATRIC DISEASE ASSOCIATED WITH STREPTOCOCCAL INFECTION) (HCC): Primary | ICD-10-CM

## 2024-12-18 DIAGNOSIS — B94.8 PANDAS (PEDIATRIC AUTOIMMUNE NEUROPSYCHIATRIC DISEASE ASSOCIATED WITH STREPTOCOCCAL INFECTION) (HCC): Primary | ICD-10-CM

## 2024-12-18 DIAGNOSIS — U09.9 COVID-19 LONG HAULER: ICD-10-CM

## 2024-12-18 DIAGNOSIS — B27.90 EPSTEIN BARR INFECTION: ICD-10-CM

## 2024-12-18 PROCEDURE — 99213 OFFICE O/P EST LOW 20 MIN: CPT | Performed by: FAMILY MEDICINE

## 2024-12-18 SDOH — ECONOMIC STABILITY: FOOD INSECURITY: WITHIN THE PAST 12 MONTHS, THE FOOD YOU BOUGHT JUST DIDN'T LAST AND YOU DIDN'T HAVE MONEY TO GET MORE.: NEVER TRUE

## 2024-12-18 SDOH — ECONOMIC STABILITY: FOOD INSECURITY: WITHIN THE PAST 12 MONTHS, YOU WORRIED THAT YOUR FOOD WOULD RUN OUT BEFORE YOU GOT MONEY TO BUY MORE.: SOMETIMES TRUE

## 2024-12-18 SDOH — ECONOMIC STABILITY: INCOME INSECURITY: HOW HARD IS IT FOR YOU TO PAY FOR THE VERY BASICS LIKE FOOD, HOUSING, MEDICAL CARE, AND HEATING?: SOMEWHAT HARD

## 2024-12-18 SDOH — ECONOMIC STABILITY: TRANSPORTATION INSECURITY
IN THE PAST 12 MONTHS, HAS LACK OF TRANSPORTATION KEPT YOU FROM MEETINGS, WORK, OR FROM GETTING THINGS NEEDED FOR DAILY LIVING?: NO

## 2024-12-18 NOTE — PROGRESS NOTES
Magdalena Blackmon, was evaluated through a synchronous (real-time) audio-video encounter. The patient (or guardian if applicable) is aware that this is a billable service, which includes applicable co-pays. This Virtual Visit was conducted with patient's (and/or legal guardian's) consent. Patient identification was verified, and a caregiver was present when appropriate.   The patient was located at Home: [facility] [facility] [address]  Provider was located at Facility (Appt Dept): [address] [place], [place] [id number]  Confirm you are appropriately licensed, registered, or certified to deliver care in the state where the patient is located as indicated above. If you are not or unsure, please re-schedule the visit: Yes, I confirm.     Magdalena Blackmon (:  1990) is a Established patient, presenting virtually for evaluation of the following:      Below is the assessment and plan developed based on review of pertinent history, physical exam, labs, studies, and medications.     Assessment & Plan  1. PANS.  She reports significant improvement in her symptoms with the use of low-dose naproxen, which has helped regain some mobility in her neck. She has also been taking fish oil, turmeric, and magnesium. She experiences occasional small seizures, which have been less frequent recently. She will continue her current regimen of low-dose naproxen, fish oil, turmeric, and magnesium. She is advised to titrate up her LDN dosage and to contact the office a week before her prescription runs out to get a refill at the higher dose. She should report any issues with the medication.    2. Long-haul COVID-19.  She continues to test positive for the spike protein, indicating the presence of the virus in her body. She experiences respiratory fatigue and occasional tremors. She is advised to add bromelain 500 mg daily and nattokinase 2000 FU twice daily to her regimen for approximately 6 months. These

## 2025-01-09 ENCOUNTER — OFFICE VISIT (OUTPATIENT)
Age: 35
End: 2025-01-09
Payer: COMMERCIAL

## 2025-01-09 VITALS
BODY MASS INDEX: 38.87 KG/M2 | WEIGHT: 211.2 LBS | DIASTOLIC BLOOD PRESSURE: 82 MMHG | HEIGHT: 62 IN | OXYGEN SATURATION: 100 % | SYSTOLIC BLOOD PRESSURE: 126 MMHG | HEART RATE: 87 BPM | TEMPERATURE: 97.8 F

## 2025-01-09 DIAGNOSIS — R55 VASOVAGAL SYNCOPE: Primary | ICD-10-CM

## 2025-01-09 DIAGNOSIS — H61.893 EAR CANAL DRYNESS, BILATERAL: ICD-10-CM

## 2025-01-09 DIAGNOSIS — R60.9 WATER RETENTION: ICD-10-CM

## 2025-01-09 PROCEDURE — 99213 OFFICE O/P EST LOW 20 MIN: CPT | Performed by: FAMILY MEDICINE

## 2025-01-09 RX ORDER — ACETIC ACID 20.65 MG/ML
4 SOLUTION AURICULAR (OTIC) 2 TIMES DAILY
Qty: 15 ML | Refills: 1 | Status: SHIPPED | OUTPATIENT
Start: 2025-01-09 | End: 2025-01-16

## 2025-01-09 RX ORDER — FUROSEMIDE 20 MG/1
20 TABLET ORAL DAILY
Qty: 20 TABLET | Refills: 2 | Status: SHIPPED | OUTPATIENT
Start: 2025-01-09

## 2025-01-09 SDOH — ECONOMIC STABILITY: FOOD INSECURITY: WITHIN THE PAST 12 MONTHS, YOU WORRIED THAT YOUR FOOD WOULD RUN OUT BEFORE YOU GOT MONEY TO BUY MORE.: NEVER TRUE

## 2025-01-09 SDOH — ECONOMIC STABILITY: FOOD INSECURITY: WITHIN THE PAST 12 MONTHS, THE FOOD YOU BOUGHT JUST DIDN'T LAST AND YOU DIDN'T HAVE MONEY TO GET MORE.: NEVER TRUE

## 2025-01-09 ASSESSMENT — PATIENT HEALTH QUESTIONNAIRE - PHQ9
2. FEELING DOWN, DEPRESSED OR HOPELESS: NOT AT ALL
SUM OF ALL RESPONSES TO PHQ9 QUESTIONS 1 & 2: 0
SUM OF ALL RESPONSES TO PHQ QUESTIONS 1-9: 0
1. LITTLE INTEREST OR PLEASURE IN DOING THINGS: NOT AT ALL

## 2025-01-09 NOTE — PROGRESS NOTES
Magdalena Blackmon (:  1990) is a 34 y.o. female, Established patient, here for evaluation of the following chief complaint(s):  Discuss Medications (Patient states furosemide is causing extreme dehydration. Patient states low dose Naproxen causes vertigo but does seem to help. ) and Fall (Patient states on  she went to kitchen to grab water and states she felt \"sinking feeling\", then passed out, fell and hit her head on the floor. Patient states she crawled to water filter and drank water and felt better and returned to bed. Patient states she's been drinking electrolytes and has been feeling better. )          Subjective   History of Present Illness  The patient presents for evaluation of a syncopal episode.    She experienced a syncopal episode on  morning, which she attributes to potential hypoglycemia and inadequate hydration due to the diuretic medication she had taken. She recalls consuming a bottle of water before bedtime and several cups of water throughout the evening but still felt dehydrated. The episode occurred as she was walking towards her water filter in the kitchen after urinating. She regained consciousness upon impact with the floor. She reports no persistent headaches or visual disturbances. She also reports pain in what she perceives to be her liver, which she associates with extreme dehydration. She has been increasing her food intake, focusing on meat and vegetables, and ensuring adequate protein and fat consumption. She reports feeling better overall, with intermittent glandular congestion and improved neck mobility. She also notes a reduction in carpal tunnel symptoms in her hands and feet. She has been adhering to an anti-inflammatory diet and believes the diuretic medication has been beneficial for fluid retention. She has been on low dose naltrexone (LDN) 4 mg and is titrating up from the lowest dose to the second highest. She experienced vertigo while on the

## 2025-02-11 DIAGNOSIS — R60.9 WATER RETENTION: ICD-10-CM

## 2025-02-11 RX ORDER — FUROSEMIDE 20 MG/1
20 TABLET ORAL DAILY
Qty: 20 TABLET | Refills: 2 | Status: SHIPPED | OUTPATIENT
Start: 2025-02-11

## 2025-02-11 NOTE — TELEPHONE ENCOUNTER
Future Appointments    This patient does not currently have any appointments scheduled.  Past Visits    Date Provider Specialty Visit Type Primary Dx   01/09/2025 [place], DO Family Medicine Office Visit Vasovagal syncope

## 2025-03-21 ENCOUNTER — PATIENT MESSAGE (OUTPATIENT)
Age: 35
End: 2025-03-21

## 2025-03-25 RX ORDER — METHYLPREDNISOLONE 4 MG/1
TABLET ORAL
Qty: 1 KIT | Refills: 0 | Status: SHIPPED | OUTPATIENT
Start: 2025-03-25

## 2025-04-07 ENCOUNTER — PATIENT MESSAGE (OUTPATIENT)
Age: 35
End: 2025-04-07

## 2025-04-07 DIAGNOSIS — G50.0 TRIGEMINAL NEURALGIA: Primary | ICD-10-CM

## 2025-04-08 RX ORDER — BACLOFEN 10 MG/1
10 TABLET ORAL 3 TIMES DAILY
Qty: 30 TABLET | Refills: 0 | Status: SHIPPED | OUTPATIENT
Start: 2025-04-08

## 2025-04-08 RX ORDER — GABAPENTIN 300 MG/1
300 CAPSULE ORAL NIGHTLY
Qty: 30 CAPSULE | Refills: 0 | Status: SHIPPED | OUTPATIENT
Start: 2025-04-08 | End: 2025-05-08

## 2025-04-23 ENCOUNTER — OFFICE VISIT (OUTPATIENT)
Age: 35
End: 2025-04-23
Payer: COMMERCIAL

## 2025-04-23 VITALS
HEART RATE: 85 BPM | TEMPERATURE: 97.8 F | WEIGHT: 212 LBS | HEIGHT: 61 IN | OXYGEN SATURATION: 99 % | DIASTOLIC BLOOD PRESSURE: 82 MMHG | BODY MASS INDEX: 40.02 KG/M2 | SYSTOLIC BLOOD PRESSURE: 120 MMHG

## 2025-04-23 DIAGNOSIS — L25.5 DERMATITIS DUE TO PLANT: Primary | ICD-10-CM

## 2025-04-23 PROCEDURE — 99213 OFFICE O/P EST LOW 20 MIN: CPT | Performed by: PHYSICIAN ASSISTANT

## 2025-04-23 PROCEDURE — 96372 THER/PROPH/DIAG INJ SC/IM: CPT | Performed by: PHYSICIAN ASSISTANT

## 2025-04-23 RX ORDER — METHYLPREDNISOLONE 4 MG/1
TABLET ORAL
Qty: 1 KIT | Refills: 0 | Status: CANCELLED | OUTPATIENT
Start: 2025-04-23 | End: 2025-04-29

## 2025-04-23 RX ORDER — TRIAMCINOLONE ACETONIDE 40 MG/ML
40 INJECTION, SUSPENSION INTRA-ARTICULAR; INTRAMUSCULAR ONCE
Status: COMPLETED | OUTPATIENT
Start: 2025-04-23 | End: 2025-04-23

## 2025-04-23 RX ADMIN — TRIAMCINOLONE ACETONIDE 40 MG: 40 INJECTION, SUSPENSION INTRA-ARTICULAR; INTRAMUSCULAR at 13:29

## 2025-04-23 NOTE — PROGRESS NOTES
the Last Year: Never true   Transportation Needs: No Transportation Needs (1/9/2025)    PRAPARE - Transportation     Lack of Transportation (Medical): No     Lack of Transportation (Non-Medical): No   Physical Activity: Not on file   Stress: Not on file   Social Connections: Not on file   Intimate Partner Violence: Not on file   Housing Stability: Low Risk  (1/9/2025)    Housing Stability Vital Sign     Unable to Pay for Housing in the Last Year: No     Number of Times Moved in the Last Year: 0     Homeless in the Last Year: No     Family History   Problem Relation Age of Onset    Atrial Fibrillation Father     Arthritis Father     Heart Disease Father     Mental Illness Father     Obesity Father     Arthritis Mother     Heart Disease Mother     Mental Illness Mother     Obesity Mother     Heart Disease Maternal Grandfather     Anemia Sister     Arthritis Sister     Heart Disease Paternal Uncle     Immune Disorder Sister         Allergies   Allergen Reactions    Amoxicillin Nausea And Vomiting and Rash    Penicillins Nausea And Vomiting, Hives and Other (See Comments)     Current Outpatient Medications   Medication Sig Dispense Refill    gabapentin (NEURONTIN) 300 MG capsule Take 1 capsule by mouth nightly for 30 days. Intended supply: 30 days 30 capsule 0    baclofen (LIORESAL) 10 MG tablet Take 1 tablet by mouth 3 times daily 30 tablet 0    methylPREDNISolone (MEDROL DOSEPACK) 4 MG tablet Take by mouth. 1 kit 0    furosemide (LASIX) 20 MG tablet Take 1 tablet by mouth daily 20 tablet 2    levonorgestrel (MIRENA, 52 MG,) IUD 52 mg 1 each by IntraUTERine route once      tiZANidine (ZANAFLEX) 4 MG tablet Take 1 tablet by mouth 3 times daily as needed (spasm) 30 tablet 0    amitriptyline (ELAVIL) 25 MG tablet Take 1 tablet by mouth nightly 30 tablet 1     No current facility-administered medications for this visit.       Objective    Vitals:    04/23/25 1303   BP: 120/82   BP Site: Right Upper Arm   Patient Position:

## 2025-05-01 ENCOUNTER — APPOINTMENT (OUTPATIENT)
Facility: CLINIC | Age: 35
End: 2025-05-01
Payer: COMMERCIAL

## 2025-05-22 ENCOUNTER — PATIENT MESSAGE (OUTPATIENT)
Age: 35
End: 2025-05-22

## 2025-05-29 DIAGNOSIS — G50.0 TRIGEMINAL NEURALGIA: ICD-10-CM

## 2025-05-29 RX ORDER — GABAPENTIN 300 MG/1
300 CAPSULE ORAL NIGHTLY
Qty: 90 CAPSULE | Refills: 0 | Status: SHIPPED | OUTPATIENT
Start: 2025-05-29 | End: 2025-08-27

## 2025-07-31 ENCOUNTER — APPOINTMENT (OUTPATIENT)
Facility: CLINIC | Age: 35
End: 2025-07-31
Payer: COMMERCIAL

## 2025-07-31 VITALS
SYSTOLIC BLOOD PRESSURE: 112 MMHG | DIASTOLIC BLOOD PRESSURE: 70 MMHG | HEIGHT: 62 IN | WEIGHT: 214.6 LBS | BODY MASS INDEX: 39.49 KG/M2

## 2025-07-31 DIAGNOSIS — Z11.59 NEED FOR HEPATITIS C SCREENING TEST: Primary | ICD-10-CM

## 2025-07-31 DIAGNOSIS — Z30.432 ENCOUNTER FOR IUD REMOVAL: ICD-10-CM

## 2025-07-31 DIAGNOSIS — Z11.3 SCREENING EXAMINATION FOR STD (SEXUALLY TRANSMITTED DISEASE): ICD-10-CM

## 2025-07-31 PROCEDURE — 58301 REMOVE INTRAUTERINE DEVICE: CPT | Performed by: REGISTERED NURSE

## 2025-07-31 PROCEDURE — 99213 OFFICE O/P EST LOW 20 MIN: CPT | Performed by: REGISTERED NURSE

## 2025-07-31 ASSESSMENT — ANXIETY QUESTIONNAIRES
5. BEING SO RESTLESS THAT IT IS HARD TO SIT STILL: NOT AT ALL
GAD7 TOTAL SCORE: 0
1. FEELING NERVOUS, ANXIOUS, OR ON EDGE: NOT AT ALL
2. NOT BEING ABLE TO STOP OR CONTROL WORRYING: NOT AT ALL
IF YOU CHECKED OFF ANY PROBLEMS ON THIS QUESTIONNAIRE, HOW DIFFICULT HAVE THESE PROBLEMS MADE IT FOR YOU TO DO YOUR WORK, TAKE CARE OF THINGS AT HOME, OR GET ALONG WITH OTHER PEOPLE: NOT DIFFICULT AT ALL
4. TROUBLE RELAXING: NOT AT ALL
7. FEELING AFRAID AS IF SOMETHING AWFUL MIGHT HAPPEN: NOT AT ALL
3. WORRYING TOO MUCH ABOUT DIFFERENT THINGS: NOT AT ALL
6. BECOMING EASILY ANNOYED OR IRRITABLE: NOT AT ALL

## 2025-07-31 ASSESSMENT — COLUMBIA-SUICIDE SEVERITY RATING SCALE - C-SSRS
6. HAVE YOU EVER DONE ANYTHING, STARTED TO DO ANYTHING, OR PREPARED TO DO ANYTHING TO END YOUR LIFE?: NO
1. IN THE PAST MONTH, HAVE YOU WISHED YOU WERE DEAD OR WISHED YOU COULD GO TO SLEEP AND NOT WAKE UP?: NO
2. HAVE YOU ACTUALLY HAD ANY THOUGHTS OF KILLING YOURSELF?: NO

## 2025-07-31 ASSESSMENT — PATIENT HEALTH QUESTIONNAIRE - PHQ9
2. FEELING DOWN, DEPRESSED OR HOPELESS: NOT AT ALL
SUM OF ALL RESPONSES TO PHQ9 QUESTIONS 1 AND 2: 0
1. LITTLE INTEREST OR PLEASURE IN DOING THINGS: NOT AT ALL

## 2025-07-31 ASSESSMENT — PAIN SCALES - GENERAL: PAINLEVEL_OUTOF10: 0-NO PAIN

## 2025-07-31 ASSESSMENT — LIFESTYLE VARIABLES
SKIP TO QUESTIONS 9-10: 1
HOW MANY STANDARD DRINKS CONTAINING ALCOHOL DO YOU HAVE ON A TYPICAL DAY: 1 OR 2
AUDIT-C TOTAL SCORE: 1
HOW OFTEN DO YOU HAVE A DRINK CONTAINING ALCOHOL: MONTHLY OR LESS
HOW OFTEN DO YOU HAVE SIX OR MORE DRINKS ON ONE OCCASION: NEVER

## 2025-07-31 NOTE — PROGRESS NOTES
Patient ID: Clover Chandler is a 35 y.o. female.    IUD Management    Performed by: MARCELLO Silver, APRN-CNP, DNP  Authorized by: MARCELLO Silver, APRN-CNP, DNP    Procedure: IUD removal           OBGyn Exam      Problem List Items Addressed This Visit    None  Visit Diagnoses         Need for hepatitis C screening test    -  Primary      Screening examination for STD (sexually transmitted disease)          Encounter for IUD removal             Subjective   Patient ID: Clover Chandler is a 35 y.o. female who presents for Procedure (Pt here for IUD removal. Pt does not wish to have another one.  ).  HPI  Pt. Desires removal of IUD. Wants STI screening  Review of Systems    Objective   Physical Exam    Assessment/Plan     Urine GC/CT/trich  HIV/RPR/Hep C       MARCELLO Silver, APRN-CNP, DNP 07/31/25 11:23 AM

## 2025-08-01 LAB
C TRACH RRNA SPEC QL NAA+PROBE: NOT DETECTED
N GONORRHOEA RRNA SPEC QL NAA+PROBE: NOT DETECTED
QUEST GC CT AMPLIFIED (ALWAYS MESSAGE): NORMAL
T VAGINALIS RRNA SPEC QL NAA+PROBE: NOT DETECTED

## 2025-09-04 ENCOUNTER — PATIENT MESSAGE (OUTPATIENT)
Age: 35
End: 2025-09-04

## 2025-09-05 RX ORDER — METHYLPREDNISOLONE 4 MG/1
TABLET ORAL
Qty: 1 KIT | Refills: 0 | Status: SHIPPED | OUTPATIENT
Start: 2025-09-05